# Patient Record
Sex: MALE | Race: BLACK OR AFRICAN AMERICAN | Employment: UNEMPLOYED | URBAN - METROPOLITAN AREA
[De-identification: names, ages, dates, MRNs, and addresses within clinical notes are randomized per-mention and may not be internally consistent; named-entity substitution may affect disease eponyms.]

---

## 2019-01-03 ENCOUNTER — HOSPITAL ENCOUNTER (EMERGENCY)
Age: 24
Discharge: HOME | End: 2019-01-03

## 2019-01-03 ENCOUNTER — APPOINTMENT (OUTPATIENT)
Dept: CT IMAGING | Age: 24
End: 2019-01-03

## 2019-01-03 VITALS
HEIGHT: 73 IN | BODY MASS INDEX: 31.14 KG/M2 | SYSTOLIC BLOOD PRESSURE: 157 MMHG | HEART RATE: 63 BPM | RESPIRATION RATE: 18 BRPM | OXYGEN SATURATION: 99 % | WEIGHT: 235 LBS | DIASTOLIC BLOOD PRESSURE: 82 MMHG | TEMPERATURE: 98.5 F

## 2019-01-03 DIAGNOSIS — R51.9 NONINTRACTABLE EPISODIC HEADACHE, UNSPECIFIED HEADACHE TYPE: Primary | ICD-10-CM

## 2019-01-03 DIAGNOSIS — J01.00 ACUTE MAXILLARY SINUSITIS, RECURRENCE NOT SPECIFIED: ICD-10-CM

## 2019-01-03 PROCEDURE — 72125 CT NECK SPINE W/O DYE: CPT

## 2019-01-03 PROCEDURE — 99283 EMERGENCY DEPT VISIT LOW MDM: CPT

## 2019-01-03 PROCEDURE — 96372 THER/PROPH/DIAG INJ SC/IM: CPT

## 2019-01-03 PROCEDURE — 6370000000 HC RX 637 (ALT 250 FOR IP): Performed by: NURSE PRACTITIONER

## 2019-01-03 PROCEDURE — 6360000002 HC RX W HCPCS: Performed by: NURSE PRACTITIONER

## 2019-01-03 PROCEDURE — 70450 CT HEAD/BRAIN W/O DYE: CPT

## 2019-01-03 RX ORDER — DEXAMETHASONE SODIUM PHOSPHATE 10 MG/ML
10 INJECTION INTRAMUSCULAR; INTRAVENOUS ONCE
Status: COMPLETED | OUTPATIENT
Start: 2019-01-03 | End: 2019-01-03

## 2019-01-03 RX ORDER — CYCLOBENZAPRINE HCL 10 MG
10 TABLET ORAL NIGHTLY PRN
Qty: 30 TABLET | Refills: 0 | Status: SHIPPED | OUTPATIENT
Start: 2019-01-03 | End: 2019-01-13

## 2019-01-03 RX ORDER — CYCLOBENZAPRINE HCL 10 MG
10 TABLET ORAL ONCE
Status: COMPLETED | OUTPATIENT
Start: 2019-01-03 | End: 2019-01-03

## 2019-01-03 RX ORDER — SUMATRIPTAN 6 MG/.5ML
6 INJECTION, SOLUTION SUBCUTANEOUS ONCE
Status: COMPLETED | OUTPATIENT
Start: 2019-01-03 | End: 2019-01-03

## 2019-01-03 RX ORDER — KETOROLAC TROMETHAMINE 30 MG/ML
60 INJECTION, SOLUTION INTRAMUSCULAR; INTRAVENOUS ONCE
Status: COMPLETED | OUTPATIENT
Start: 2019-01-03 | End: 2019-01-03

## 2019-01-03 RX ORDER — BUTALBITAL, ASPIRIN, AND CAFFEINE 50; 325; 40 MG/1; MG/1; MG/1
1 CAPSULE ORAL EVERY 6 HOURS PRN
Qty: 20 CAPSULE | Refills: 0 | Status: SHIPPED | OUTPATIENT
Start: 2019-01-03 | End: 2020-12-22

## 2019-01-03 RX ORDER — AZITHROMYCIN 250 MG/1
TABLET, FILM COATED ORAL
Qty: 6 TABLET | Refills: 0 | Status: SHIPPED | OUTPATIENT
Start: 2019-01-03 | End: 2019-01-13

## 2019-01-03 RX ADMIN — DEXAMETHASONE SODIUM PHOSPHATE 10 MG: 10 INJECTION INTRAMUSCULAR; INTRAVENOUS at 19:42

## 2019-01-03 RX ADMIN — SUMATRIPTAN 6 MG: 6 INJECTION SUBCUTANEOUS at 21:31

## 2019-01-03 RX ADMIN — CYCLOBENZAPRINE HYDROCHLORIDE 10 MG: 10 TABLET, FILM COATED ORAL at 19:43

## 2019-01-03 RX ADMIN — KETOROLAC TROMETHAMINE 60 MG: 30 INJECTION, SOLUTION INTRAMUSCULAR at 19:43

## 2019-01-03 ASSESSMENT — PAIN SCALES - GENERAL
PAINLEVEL_OUTOF10: 10
PAINLEVEL_OUTOF10: 10
PAINLEVEL_OUTOF10: 5

## 2019-01-03 ASSESSMENT — PAIN DESCRIPTION - LOCATION
LOCATION: HEAD
LOCATION: HEAD

## 2019-01-03 ASSESSMENT — PAIN DESCRIPTION - PAIN TYPE: TYPE: ACUTE PAIN

## 2019-05-14 ENCOUNTER — APPOINTMENT (OUTPATIENT)
Dept: GENERAL RADIOLOGY | Age: 24
End: 2019-05-14

## 2019-05-14 ENCOUNTER — HOSPITAL ENCOUNTER (EMERGENCY)
Age: 24
Discharge: HOME | End: 2019-05-14
Attending: EMERGENCY MEDICINE

## 2019-05-14 VITALS
RESPIRATION RATE: 16 BRPM | SYSTOLIC BLOOD PRESSURE: 179 MMHG | TEMPERATURE: 98.6 F | HEART RATE: 75 BPM | WEIGHT: 240 LBS | DIASTOLIC BLOOD PRESSURE: 78 MMHG | BODY MASS INDEX: 30.8 KG/M2 | OXYGEN SATURATION: 97 % | HEIGHT: 74 IN

## 2019-05-14 DIAGNOSIS — M54.50 LUMBAR BACK PAIN: ICD-10-CM

## 2019-05-14 DIAGNOSIS — K62.5 RECTAL BLEEDING: Primary | ICD-10-CM

## 2019-05-14 LAB
ALBUMIN SERPL-MCNC: 4.1 G/DL (ref 3.5–5.2)
ALP BLD-CCNC: 61 U/L (ref 40–129)
ALT SERPL-CCNC: 40 U/L (ref 0–40)
ANION GAP SERPL CALCULATED.3IONS-SCNC: 10 MMOL/L (ref 7–16)
AST SERPL-CCNC: 27 U/L (ref 0–39)
BASOPHILS ABSOLUTE: 0.08 E9/L (ref 0–0.2)
BASOPHILS RELATIVE PERCENT: 0.8 % (ref 0–2)
BILIRUB SERPL-MCNC: 0.2 MG/DL (ref 0–1.2)
BILIRUBIN URINE: NEGATIVE
BLOOD, URINE: NEGATIVE
BUN BLDV-MCNC: 12 MG/DL (ref 6–20)
CALCIUM SERPL-MCNC: 9 MG/DL (ref 8.6–10.2)
CHLORIDE BLD-SCNC: 101 MMOL/L (ref 98–107)
CLARITY: CLEAR
CO2: 26 MMOL/L (ref 22–29)
COLOR: YELLOW
CREAT SERPL-MCNC: 1 MG/DL (ref 0.7–1.2)
EOSINOPHILS ABSOLUTE: 0.24 E9/L (ref 0.05–0.5)
EOSINOPHILS RELATIVE PERCENT: 2.5 % (ref 0–6)
GFR AFRICAN AMERICAN: >60
GFR NON-AFRICAN AMERICAN: >60 ML/MIN/1.73
GLUCOSE BLD-MCNC: 95 MG/DL (ref 74–99)
GLUCOSE URINE: NEGATIVE MG/DL
HCT VFR BLD CALC: 41.8 % (ref 37–54)
HEMOGLOBIN: 13.8 G/DL (ref 12.5–16.5)
IMMATURE GRANULOCYTES #: 0.05 E9/L
IMMATURE GRANULOCYTES %: 0.5 % (ref 0–5)
KETONES, URINE: NEGATIVE MG/DL
LEUKOCYTE ESTERASE, URINE: NEGATIVE
LYMPHOCYTES ABSOLUTE: 3.62 E9/L (ref 1.5–4)
LYMPHOCYTES RELATIVE PERCENT: 38.1 % (ref 20–42)
MCH RBC QN AUTO: 30.3 PG (ref 26–35)
MCHC RBC AUTO-ENTMCNC: 33 % (ref 32–34.5)
MCV RBC AUTO: 91.7 FL (ref 80–99.9)
MONOCYTES ABSOLUTE: 0.87 E9/L (ref 0.1–0.95)
MONOCYTES RELATIVE PERCENT: 9.1 % (ref 2–12)
NEUTROPHILS ABSOLUTE: 4.65 E9/L (ref 1.8–7.3)
NEUTROPHILS RELATIVE PERCENT: 49 % (ref 43–80)
NITRITE, URINE: NEGATIVE
PDW BLD-RTO: 14 FL (ref 11.5–15)
PH UA: 7 (ref 5–9)
PLATELET # BLD: 186 E9/L (ref 130–450)
PMV BLD AUTO: 11.4 FL (ref 7–12)
POTASSIUM SERPL-SCNC: 4.3 MMOL/L (ref 3.5–5)
PROTEIN UA: NEGATIVE MG/DL
RBC # BLD: 4.56 E12/L (ref 3.8–5.8)
SODIUM BLD-SCNC: 137 MMOL/L (ref 132–146)
SPECIFIC GRAVITY UA: 1.01 (ref 1–1.03)
TOTAL PROTEIN: 6.8 G/DL (ref 6.4–8.3)
UROBILINOGEN, URINE: 1 E.U./DL
WBC # BLD: 9.5 E9/L (ref 4.5–11.5)

## 2019-05-14 PROCEDURE — 87088 URINE BACTERIA CULTURE: CPT

## 2019-05-14 PROCEDURE — 85025 COMPLETE CBC W/AUTO DIFF WBC: CPT

## 2019-05-14 PROCEDURE — 81003 URINALYSIS AUTO W/O SCOPE: CPT

## 2019-05-14 PROCEDURE — 99283 EMERGENCY DEPT VISIT LOW MDM: CPT

## 2019-05-14 PROCEDURE — 80053 COMPREHEN METABOLIC PANEL: CPT

## 2019-05-14 PROCEDURE — 72100 X-RAY EXAM L-S SPINE 2/3 VWS: CPT

## 2019-05-14 RX ORDER — PANTOPRAZOLE SODIUM 40 MG/1
40 TABLET, DELAYED RELEASE ORAL DAILY
Qty: 10 TABLET | Refills: 0 | Status: SHIPPED | OUTPATIENT
Start: 2019-05-14 | End: 2020-12-22

## 2019-05-14 RX ORDER — SODIUM CHLORIDE 0.9 % (FLUSH) 0.9 %
10 SYRINGE (ML) INJECTION PRN
Status: DISCONTINUED | OUTPATIENT
Start: 2019-05-14 | End: 2019-05-15 | Stop reason: HOSPADM

## 2019-05-14 RX ORDER — SUCRALFATE 1 G/1
1 TABLET ORAL 4 TIMES DAILY
Qty: 120 TABLET | Refills: 3 | Status: SHIPPED | OUTPATIENT
Start: 2019-05-14 | End: 2020-12-22

## 2019-05-14 RX ORDER — ORPHENADRINE CITRATE 100 MG/1
100 TABLET, EXTENDED RELEASE ORAL 2 TIMES DAILY
Qty: 20 TABLET | Refills: 0 | Status: SHIPPED | OUTPATIENT
Start: 2019-05-14 | End: 2019-05-24

## 2019-05-14 ASSESSMENT — ENCOUNTER SYMPTOMS
SHORTNESS OF BREATH: 0
ABDOMINAL PAIN: 0
EYE REDNESS: 0
BACK PAIN: 1
VOMITING: 0
HEMATOCHEZIA: 1
WHEEZING: 0
EYE PAIN: 0
DIARRHEA: 0
HEMATEMESIS: 0
SINUS PRESSURE: 0
SORE THROAT: 0
NAUSEA: 0
COUGH: 0
BLOOD IN STOOL: 1
EYE DISCHARGE: 0

## 2019-05-14 ASSESSMENT — PAIN DESCRIPTION - ORIENTATION: ORIENTATION: LOWER

## 2019-05-14 ASSESSMENT — PAIN DESCRIPTION - PAIN TYPE: TYPE: ACUTE PAIN

## 2019-05-14 ASSESSMENT — PAIN SCALES - GENERAL: PAINLEVEL_OUTOF10: 10

## 2019-05-14 ASSESSMENT — PAIN DESCRIPTION - FREQUENCY: FREQUENCY: INTERMITTENT

## 2019-05-14 ASSESSMENT — PAIN DESCRIPTION - LOCATION: LOCATION: BACK

## 2019-05-14 ASSESSMENT — PAIN DESCRIPTION - DESCRIPTORS: DESCRIPTORS: THROBBING

## 2019-05-15 NOTE — ED PROVIDER NOTES
28-year-old male presents emergency Department with 2 complaints. Patient states for the last couple days he's had blood in stools. He states he has had some straining to have bowel movements. Patient states that he also has had intermittent low back pains for the last 2 weeks. Worse the lower part of his back he states he's her midline back pain. He denies fevers chills denies use of IV drugs use of steroids use of chemotherapy immunocompromised state problems with bowel or bladder function outside of mild constipation and no issues with urination. He states no tingling down the legs. He states no difficulties in ambulation. The history is provided by the patient. Rectal Bleeding   Quality:  Bright red  Amount:  Scant  Duration:  3 days  Timing:  Intermittent  Chronicity:  New  Context: constipation    Similar prior episodes: no    Relieved by:  Nothing  Worsened by:  Nothing  Ineffective treatments: stool softeners. Associated symptoms: no abdominal pain, no dizziness, no fever, no hematemesis, no light-headedness, no loss of consciousness, no recent illness and no vomiting        Review of Systems   Constitutional: Negative for chills and fever. HENT: Negative for ear pain, sinus pressure and sore throat. Eyes: Negative for pain, discharge and redness. Respiratory: Negative for cough, shortness of breath and wheezing. Cardiovascular: Negative for chest pain. Gastrointestinal: Positive for blood in stool and hematochezia. Negative for abdominal pain, diarrhea, hematemesis, nausea and vomiting. Genitourinary: Negative for dysuria and frequency. Musculoskeletal: Positive for back pain. Negative for arthralgias. Skin: Negative for rash and wound. Neurological: Negative for dizziness, loss of consciousness, weakness, light-headedness and headaches. Hematological: Negative for adenopathy. All other systems reviewed and are negative.       Physical Exam   Constitutional: He is oriented to person, place, and time. He appears well-developed and well-nourished. No distress. HENT:   Head: Normocephalic and atraumatic. Eyes: Pupils are equal, round, and reactive to light. EOM are normal.   Neck: Normal range of motion. Neck supple. Cardiovascular: Normal rate and regular rhythm. Pulmonary/Chest: Effort normal and breath sounds normal.   Abdominal: Soft. Bowel sounds are normal. He exhibits no distension and no mass. There is no tenderness. There is no guarding. Genitourinary: Rectal exam shows no external hemorrhoid, no internal hemorrhoid, no fissure, no mass, no tenderness, anal tone normal and guaiac negative stool. Musculoskeletal:        Lumbar back: He exhibits tenderness and pain. He exhibits normal range of motion, no bony tenderness and no swelling. Neurological: He is alert and oriented to person, place, and time. Procedures    MDM    ED Course as of May 14 2157   Tue May 14, 2019   2049 Patient is seen and examined. Patient had Hemoccult test done that shows no bright red stools and is Hemoccult negative. X-ray of the backs been ordered. Basic lab work to evaluate hemoglobin Electrolytes been ordered. [CF]   2156 Imaging workup and labs are found to be reassuring. The patient is provided proptotic same Carafate to help protect the lining this GI tract. He also given muscle relaxers. Patient is instructed to stay away from medications such as NSAIDs this can irritate rectal bleeding. He is provided a general surgery follow-up as he may need a colonoscopy. He is also provided a family physician to establish with 2 set up for follow up. [CF]      ED Course User Index  [CF] Zonia Stern DO     --------------------------------------------- PAST HISTORY ---------------------------------------------  Past Medical History:  has a past medical history of Asthma.     Past Surgical History:  has a past surgical history that includes hernia repair; [place] tooth extraction; and 0 - 40 U/L    AST 27 0 - 39 U/L   Urinalysis   Result Value Ref Range    Color, UA Yellow Straw/Yellow    Clarity, UA Clear Clear    Glucose, Ur Negative Negative mg/dL    Bilirubin Urine Negative Negative    Ketones, Urine Negative Negative mg/dL    Specific Gravity, UA 1.015 1.005 - 1.030    Blood, Urine Negative Negative    pH, UA 7.0 5.0 - 9.0    Protein, UA Negative Negative mg/dL    Urobilinogen, Urine 1.0 <2.0 E.U./dL    Nitrite, Urine Negative Negative    Leukocyte Esterase, Urine Negative Negative       Radiology:  XR LUMBAR SPINE (2-3 VIEWS)   Final Result   Normal lumbar spinal appearance without evidence of fracture or   displacement. Disc spacing is preserved.                ------------------------- NURSING NOTES AND VITALS REVIEWED ---------------------------  Date / Time Roomed:  5/14/2019  8:20 PM  ED Bed Assignment:  02/02    The nursing notes within the ED encounter and vital signs as below have been reviewed. BP (!) 179/78   Pulse 75   Temp 98.6 °F (37 °C) (Oral)   Resp 16   Ht 6' 2\" (1.88 m)   Wt 240 lb (108.9 kg)   SpO2 97%   BMI 30.81 kg/m²   Oxygen Saturation Interpretation: Normal      ------------------------------------------ PROGRESS NOTES ------------------------------------------  I have spoken with the patient and discussed todays results, in addition to providing specific details for the plan of care and counseling regarding the diagnosis and prognosis. Their questions are answered at this time and they are agreeable with the plan. I discussed at length with them reasons for immediate return here for re evaluation. They will followup with their primary care physician by calling their office tomorrow. --------------------------------- ADDITIONAL PROVIDER NOTES ---------------------------------  At this time the patient is without objective evidence of an acute process requiring hospitalization or inpatient management.   They have remained hemodynamically stable throughout their entire ED visit and are stable for discharge with outpatient follow-up. The plan has been discussed in detail and they are aware of the specific conditions for emergent return, as well as the importance of follow-up. New Prescriptions    ORPHENADRINE (NORFLEX) 100 MG EXTENDED RELEASE TABLET    Take 1 tablet by mouth 2 times daily for 10 days    PANTOPRAZOLE (PROTONIX) 40 MG TABLET    Take 1 tablet by mouth daily for 10 days    SUCRALFATE (CARAFATE) 1 GM TABLET    Take 1 tablet by mouth 4 times daily       Diagnosis:  1. Rectal bleeding    2. Lumbar back pain        Disposition:  Patient's disposition: Discharge to home  Patient's condition is stable.            Drew DO Huong  Resident  05/14/19 [id number]

## 2019-05-17 LAB — URINE CULTURE, ROUTINE: NORMAL

## 2020-12-07 ENCOUNTER — APPOINTMENT (OUTPATIENT)
Dept: GENERAL RADIOLOGY | Age: 25
End: 2020-12-07
Payer: MEDICAID

## 2020-12-07 ENCOUNTER — TELEPHONE (OUTPATIENT)
Dept: ADMINISTRATIVE | Age: 25
End: 2020-12-07

## 2020-12-07 ENCOUNTER — HOSPITAL ENCOUNTER (EMERGENCY)
Age: 25
Discharge: HOME | End: 2020-12-07
Attending: EMERGENCY MEDICINE
Payer: MEDICAID

## 2020-12-07 VITALS
RESPIRATION RATE: 16 BRPM | BODY MASS INDEX: 32.08 KG/M2 | OXYGEN SATURATION: 99 % | WEIGHT: 250 LBS | HEART RATE: 78 BPM | SYSTOLIC BLOOD PRESSURE: 148 MMHG | HEIGHT: 74 IN | TEMPERATURE: 97.6 F | DIASTOLIC BLOOD PRESSURE: 87 MMHG

## 2020-12-07 LAB
AMPHETAMINE SCREEN, URINE: NOT DETECTED
ANION GAP SERPL CALCULATED.3IONS-SCNC: 9 MMOL/L (ref 7–16)
BARBITURATE SCREEN URINE: NOT DETECTED
BASOPHILS ABSOLUTE: 0.06 E9/L (ref 0–0.2)
BASOPHILS RELATIVE PERCENT: 0.8 % (ref 0–2)
BENZODIAZEPINE SCREEN, URINE: NOT DETECTED
BUN BLDV-MCNC: 8 MG/DL (ref 6–20)
CALCIUM SERPL-MCNC: 9.3 MG/DL (ref 8.6–10.2)
CANNABINOID SCREEN URINE: POSITIVE
CHLORIDE BLD-SCNC: 98 MMOL/L (ref 98–107)
CK MB: 2.5 NG/ML (ref 0–7.7)
CO2: 28 MMOL/L (ref 22–29)
COCAINE METABOLITE SCREEN URINE: NOT DETECTED
CREAT SERPL-MCNC: 1.1 MG/DL (ref 0.7–1.2)
D DIMER: <200 NG/ML DDU
EOSINOPHILS ABSOLUTE: 0.21 E9/L (ref 0.05–0.5)
EOSINOPHILS RELATIVE PERCENT: 2.7 % (ref 0–6)
FENTANYL SCREEN, URINE: NOT DETECTED
GFR AFRICAN AMERICAN: >60
GFR NON-AFRICAN AMERICAN: >60 ML/MIN/1.73
GLUCOSE BLD-MCNC: 99 MG/DL (ref 74–99)
HCT VFR BLD CALC: 45.7 % (ref 37–54)
HEMOGLOBIN: 15.3 G/DL (ref 12.5–16.5)
IMMATURE GRANULOCYTES #: 0.04 E9/L
IMMATURE GRANULOCYTES %: 0.5 % (ref 0–5)
LV EF: 60 %
LVEF MODALITY: NORMAL
LYMPHOCYTES ABSOLUTE: 2.32 E9/L (ref 1.5–4)
LYMPHOCYTES RELATIVE PERCENT: 29.6 % (ref 20–42)
Lab: ABNORMAL
MCH RBC QN AUTO: 29.8 PG (ref 26–35)
MCHC RBC AUTO-ENTMCNC: 33.5 % (ref 32–34.5)
MCV RBC AUTO: 88.9 FL (ref 80–99.9)
METHADONE SCREEN, URINE: NOT DETECTED
MONOCYTES ABSOLUTE: 0.67 E9/L (ref 0.1–0.95)
MONOCYTES RELATIVE PERCENT: 8.6 % (ref 2–12)
NEUTROPHILS ABSOLUTE: 4.53 E9/L (ref 1.8–7.3)
NEUTROPHILS RELATIVE PERCENT: 57.8 % (ref 43–80)
OPIATE SCREEN URINE: NOT DETECTED
OXYCODONE URINE: NOT DETECTED
PDW BLD-RTO: 13.9 FL (ref 11.5–15)
PHENCYCLIDINE SCREEN URINE: NOT DETECTED
PLATELET # BLD: 225 E9/L (ref 130–450)
PMV BLD AUTO: 11.2 FL (ref 7–12)
POTASSIUM REFLEX MAGNESIUM: 4.3 MMOL/L (ref 3.5–5)
PRO-BNP: 24 PG/ML (ref 0–125)
RBC # BLD: 5.14 E12/L (ref 3.8–5.8)
SEDIMENTATION RATE, ERYTHROCYTE: 3 MM/HR (ref 0–15)
SODIUM BLD-SCNC: 135 MMOL/L (ref 132–146)
TROPONIN: <0.01 NG/ML (ref 0–0.03)
TROPONIN: <0.01 NG/ML (ref 0–0.03)
WBC # BLD: 7.8 E9/L (ref 4.5–11.5)

## 2020-12-07 PROCEDURE — 85378 FIBRIN DEGRADE SEMIQUANT: CPT

## 2020-12-07 PROCEDURE — 85025 COMPLETE CBC W/AUTO DIFF WBC: CPT

## 2020-12-07 PROCEDURE — 85651 RBC SED RATE NONAUTOMATED: CPT

## 2020-12-07 PROCEDURE — 83880 ASSAY OF NATRIURETIC PEPTIDE: CPT

## 2020-12-07 PROCEDURE — 99204 OFFICE O/P NEW MOD 45 MIN: CPT | Performed by: INTERNAL MEDICINE

## 2020-12-07 PROCEDURE — 93010 ELECTROCARDIOGRAM REPORT: CPT | Performed by: INTERNAL MEDICINE

## 2020-12-07 PROCEDURE — 71045 X-RAY EXAM CHEST 1 VIEW: CPT

## 2020-12-07 PROCEDURE — 93306 TTE W/DOPPLER COMPLETE: CPT

## 2020-12-07 PROCEDURE — 93005 ELECTROCARDIOGRAM TRACING: CPT | Performed by: STUDENT IN AN ORGANIZED HEALTH CARE EDUCATION/TRAINING PROGRAM

## 2020-12-07 PROCEDURE — 82553 CREATINE MB FRACTION: CPT

## 2020-12-07 PROCEDURE — 99284 EMERGENCY DEPT VISIT MOD MDM: CPT

## 2020-12-07 PROCEDURE — 80048 BASIC METABOLIC PNL TOTAL CA: CPT

## 2020-12-07 PROCEDURE — U0003 INFECTIOUS AGENT DETECTION BY NUCLEIC ACID (DNA OR RNA); SEVERE ACUTE RESPIRATORY SYNDROME CORONAVIRUS 2 (SARS-COV-2) (CORONAVIRUS DISEASE [COVID-19]), AMPLIFIED PROBE TECHNIQUE, MAKING USE OF HIGH THROUGHPUT TECHNOLOGIES AS DESCRIBED BY CMS-2020-01-R: HCPCS

## 2020-12-07 PROCEDURE — 84484 ASSAY OF TROPONIN QUANT: CPT

## 2020-12-07 PROCEDURE — 80307 DRUG TEST PRSMV CHEM ANLYZR: CPT

## 2020-12-07 PROCEDURE — 93005 ELECTROCARDIOGRAM TRACING: CPT | Performed by: EMERGENCY MEDICINE

## 2020-12-07 RX ORDER — METHYLPREDNISOLONE 4 MG/1
TABLET ORAL
Qty: 1 KIT | Status: SHIPPED | OUTPATIENT
Start: 2020-12-07 | End: 2020-12-13

## 2020-12-07 RX ORDER — ALBUTEROL SULFATE 90 UG/1
2 AEROSOL, METERED RESPIRATORY (INHALATION) 4 TIMES DAILY PRN
Qty: 1 INHALER | Refills: 0 | Status: SHIPPED | OUTPATIENT
Start: 2020-12-07 | End: 2020-12-21 | Stop reason: SDUPTHER

## 2020-12-07 ASSESSMENT — ENCOUNTER SYMPTOMS
NAUSEA: 0
VOMITING: 0
BACK PAIN: 0
SORE THROAT: 0
COUGH: 0
RHINORRHEA: 0
DIARRHEA: 1
BLOOD IN STOOL: 0
SHORTNESS OF BREATH: 0
ABDOMINAL PAIN: 0
CONSTIPATION: 0

## 2020-12-07 ASSESSMENT — PAIN SCALES - GENERAL: PAINLEVEL_OUTOF10: 7

## 2020-12-07 ASSESSMENT — PAIN DESCRIPTION - LOCATION: LOCATION: CHEST

## 2020-12-07 ASSESSMENT — PAIN DESCRIPTION - PAIN TYPE: TYPE: ACUTE PAIN

## 2020-12-07 NOTE — CONSULTS
CHIEF COMPLAINT: Chest pain    HISTORY OF PRESENT ILLNESS: Patient is a 30 y.o. male seen at the request of Dr. Teresa Zeng and not followed by cardiology. Patient complains of chest pain. Onset was several days ago, with unchanged course since that time. The patient describes the pain as intermittent, occurring with increasing frequency, precordial and pressure like in nature, does not radiate. Patient rates pain as a moderate in intensity. Associated symptoms are chest pain and dyspnea. Aggravating factors are deep inspiration and positional changes. Alleviating factors are: none. Past Medical History:   Diagnosis Date    Asthma        Patient Active Problem List   Diagnosis    Eczema    Moderate persistent asthma without complication    Nonintractable episodic headache    Acute maxillary sinusitis       No Known Allergies    Current Facility-Administered Medications   Medication Dose Route Frequency Provider Last Rate Last Dose    perflutren lipid microspheres (DEFINITY) injection 1.65 mg  1.5 mL Intravenous ONCE PRN Angele Aschoff, DO         Current Outpatient Medications   Medication Sig Dispense Refill    pantoprazole (PROTONIX) 40 MG tablet Take 1 tablet by mouth daily for 10 days 10 tablet 0    sucralfate (CARAFATE) 1 GM tablet Take 1 tablet by mouth 4 times daily 120 tablet 3    butalbital-aspirin-caffeine (FIORINAL) -40 MG per capsule Take 1 capsule by mouth every 6 hours as needed for Headaches for up to 7 days. . 20 capsule 0       Social History     Socioeconomic History    Marital status: Single     Spouse name: Not on file    Number of children: Not on file    Years of education: Not on file    Highest education level: Not on file   Occupational History    Not on file   Social Needs    Financial resource strain: Not on file    Food insecurity     Worry: Not on file     Inability: Not on file    Transportation needs     Medical: Not on file     Non-medical: Not on file Tobacco Use    Smoking status: Never Smoker    Smokeless tobacco: Never Used    Tobacco comment: smokes marjuana, twice a week    Substance and Sexual Activity    Alcohol use: Yes     Comment: Social 1x per month (1 drinks)    Drug use: Yes     Types: Marijuana    Sexual activity: Yes     Partners: Female   Lifestyle    Physical activity     Days per week: Not on file     Minutes per session: Not on file    Stress: Not on file   Relationships    Social connections     Talks on phone: Not on file     Gets together: Not on file     Attends Anabaptism service: Not on file     Active member of club or organization: Not on file     Attends meetings of clubs or organizations: Not on file     Relationship status: Not on file    Intimate partner violence     Fear of current or ex partner: Not on file     Emotionally abused: Not on file     Physically abused: Not on file     Forced sexual activity: Not on file   Other Topics Concern    Not on file   Social History Narrative    Not on file       Family History   Problem Relation Age of Onset    Cancer Paternal Grandmother         breast       Review of Systems:   Heart: as above   Lungs: as above   Eyes: denies changes in vision or discharge. Ears: denies changes in hearing or pain. Nose: denies epistaxis or masses   Throat: denies sore throat or trouble swallowing. Neuro: denies numbness, tingling, tremors. Skin: denies rashes or itching. : denies hematuria, dysuria   GI: denies vomiting, diarrhea   Psych: denies mood changed, anxiety, depression. all others negative. Physical Exam   BP (!) 194/81   Pulse 75   Temp 97.6 °F (36.4 °C)   Resp 16   Ht 6' 2\" (1.88 m)   Wt 250 lb (113.4 kg)   SpO2 100%   BMI 32.10 kg/m²   Constitutional: Oriented to person, place, and time. Well-developed and well-nourished. No distress. Head: Normocephalic and atraumatic. Eyes: EOM are normal. Pupils are equal, round, and reactive to light.    Neck: Normal range of motion. Neck supple. No hepatojugular reflux and no JVD present. Carotid bruit is not present. No tracheal deviation present. No thyromegaly present. Cardiovascular: Normal rate, regular rhythm, normal heart sounds and intact distal pulses. Exam reveals no gallop and no friction rub. No murmur heard. Pulmonary/Chest: Effort normal and breath sounds normal. No respiratory distress. No wheezes. No rales. No tenderness. Abdominal: Soft. Bowel sounds are normal. No distension and no mass. No tenderness. No rebound and no guarding. Musculoskeletal: Normal range of motion. No edema and no tenderness. Lymphadenopathy:   No cervical adenopathy. No groin adenopathy. Neurological: Alert and oriented to person, place, and time. Skin: Skin is warm and dry. No rash noted. Not diaphoretic. No erythema. Psychiatric: Normal mood and affect. Behavior is normal.       CBC:   Lab Results   Component Value Date    WBC 7.8 12/07/2020    RBC 5.14 12/07/2020    HGB 15.3 12/07/2020    HCT 45.7 12/07/2020    MCV 88.9 12/07/2020    MCH 29.8 12/07/2020    MCHC 33.5 12/07/2020    RDW 13.9 12/07/2020     12/07/2020    MPV 11.2 12/07/2020     BMP:   Lab Results   Component Value Date     12/07/2020    K 4.3 12/07/2020    CL 98 12/07/2020    CO2 28 12/07/2020    BUN 8 12/07/2020    LABALBU 4.1 05/14/2019    CREATININE 1.1 12/07/2020    CALCIUM 9.3 12/07/2020    GFRAA >60 12/07/2020    LABGLOM >60 12/07/2020     Magnesium:  No results found for: MG  Cardiac Enzymes:   Lab Results   Component Value Date    CKMB 2.5 12/07/2020    TROPONINI <0.01 12/07/2020      PT/INR:  No results found for: PROTIME, INR  TSH:  No results found for: TSH    Rhythm Strip: normal sinus rhythm. EKG:  normal sinus rhythm, nonspecific ST and T waves changes, early repol.     ASSESSMENT AND PLAN:  Patient Active Problem List   Diagnosis    Eczema    Moderate persistent asthma without complication    Nonintractable episodic headache    Acute maxillary sinusitis     1. Chest pain:  Trop and d-dimer normal.     Echo prelim with normal LVEF and wall motion, no significant valve issues and no pericardial effusion. ESR pending, but clinically suggests pericarditis/pleuritis with underlying viral syndrome. Course of NSAIDs (naproxen 500 mg BID x 7 days) and outpatient follow up. 2. Viral syndrome    3. Asthma    Sarika Mayo D.O.   Cardiologist  Cardiology, [facility]

## 2020-12-07 NOTE — TELEPHONE ENCOUNTER
Patient called to schedule an ER f/u with Dr. Julio Quinonez for chest pain, he is a new patient and can be reached at [phone].

## 2020-12-07 NOTE — ED PROVIDER NOTES
Neymar Crawford is a 30 y.o. male with no PMHx who presents for evaluation of chest pain, beginning two days ago. The complaint has been intermittent, mild in severity, and worsened by change in position. The patient states that two to three days ago he started to develop left sided chest pain and fatigue. He notes that it can be made worse by different positions. Patient notes it can also be made worse when he is at rest.  The patient denies any drug use with exception of marijuana. Denies any cardiac family history. States cancer does run in the family  Patient states he had some diarrhea last week that lasted 2 to 3 days. He notes he is chronically congested and is unsure if he had a recent URI. Patient denies any associated dizziness, lightheadedness, shortness of breath. The history is provided by the patient and medical records. Review of Systems   Constitutional: Positive for fatigue. Negative for chills and fever. HENT: Positive for congestion. Negative for postnasal drip, rhinorrhea and sore throat. Eyes: Negative for visual disturbance. Respiratory: Negative for cough and shortness of breath. Cardiovascular: Positive for chest pain. Gastrointestinal: Positive for diarrhea. Negative for abdominal pain, blood in stool, constipation, nausea and vomiting. Genitourinary: Negative for difficulty urinating, dysuria and urgency. Musculoskeletal: Negative for back pain. Skin: Negative for rash. Neurological: Negative for dizziness, numbness and headaches. Physical Exam  Vitals signs and nursing note reviewed. Constitutional:       General: He is not in acute distress. Appearance: Normal appearance. He is well-developed. He is not ill-appearing. HENT:      Head: Normocephalic and atraumatic. Right Ear: External ear normal.      Left Ear: External ear normal.   Eyes:      General:         Right eye: No discharge. Left eye: No discharge. outpatient. Outpatient COVID also sent. Patient continues to be non-toxic on re-evaluation. Findings were discussed with the patient and reasons to immediately return to the ED were articulated to them. They will follow-up with their PCP and Cardiology. --------------------------------------------- PAST HISTORY ---------------------------------------------  Past Medical History:  has a past medical history of Asthma. Past Surgical History:  has a past surgical history that includes hernia repair; [place] tooth extraction; and Wrist arthroscopy (Left, 06/24/2016). Social History:  reports that he has never smoked. He has never used smokeless tobacco. He reports current alcohol use. He reports current drug use. Drug: Marijuana. Family History: family history includes Cancer in his paternal grandmother. The patients home medications have been reviewed. Allergies: Patient has no known allergies.     -------------------------------------------------- RESULTS -------------------------------------------------  Labs:  Results for orders placed or performed during the hospital encounter of 12/07/20   CBC Auto Differential   Result Value Ref Range    WBC 7.8 4.5 - 11.5 E9/L    RBC 5.14 3.80 - 5.80 E12/L    Hemoglobin 15.3 12.5 - 16.5 g/dL    Hematocrit 45.7 37.0 - 54.0 %    MCV 88.9 80.0 - 99.9 fL    MCH 29.8 26.0 - 35.0 pg    MCHC 33.5 32.0 - 34.5 %    RDW 13.9 11.5 - 15.0 fL    Platelets 653 391 - 337 E9/L    MPV 11.2 7.0 - 12.0 fL    Neutrophils % 57.8 43.0 - 80.0 %    Immature Granulocytes % 0.5 0.0 - 5.0 %    Lymphocytes % 29.6 20.0 - 42.0 %    Monocytes % 8.6 2.0 - 12.0 %    Eosinophils % 2.7 0.0 - 6.0 %    Basophils % 0.8 0.0 - 2.0 %    Neutrophils Absolute 4.53 1.80 - 7.30 E9/L    Immature Granulocytes # 0.04 E9/L    Lymphocytes Absolute 2.32 1.50 - 4.00 E9/L    Monocytes Absolute 0.67 0.10 - 0.95 E9/L    Eosinophils Absolute 0.21 0.05 - 0.50 E9/L    Basophils Absolute 0.06 0.00 - 0.20 E9/L   Brain Natriuretic Peptide   Result Value Ref Range    Pro-BNP 24 0 - 125 pg/mL   Troponin   Result Value Ref Range    Troponin <0.01 0.00 - 0.03 ng/mL   URINE DRUG SCREEN   Result Value Ref Range    Amphetamine Screen, Urine NOT DETECTED Negative <1000 ng/mL    Barbiturate Screen, Ur NOT DETECTED Negative < 200 ng/mL    Benzodiazepine Screen, Urine NOT DETECTED Negative < 200 ng/mL    Cannabinoid Scrn, Ur POSITIVE (A) Negative < 50ng/mL    Cocaine Metabolite Screen, Urine NOT DETECTED Negative < 300 ng/mL    Opiate Scrn, Ur NOT DETECTED Negative < 300ng/mL    PCP Screen, Urine NOT DETECTED Negative < 25 ng/mL    Methadone Screen, Urine NOT DETECTED Negative <300 ng/mL    Oxycodone Urine NOT DETECTED Negative <100 ng/mL    FENTANYL SCREEN, URINE NOT DETECTED Negative <1 ng/mL    Drug Screen Comment: see below    Sedimentation Rate   Result Value Ref Range    Sed Rate 3 0 - 15 mm/Hr   CK-MB   Result Value Ref Range    CK-MB 2.5 0.0 - 7.7 ng/mL   Basic Metabolic Panel w/ Reflex to MG   Result Value Ref Range    Sodium 135 132 - 146 mmol/L    Potassium reflex Magnesium 4.3 3.5 - 5.0 mmol/L    Chloride 98 98 - 107 mmol/L    CO2 28 22 - 29 mmol/L    Anion Gap 9 7 - 16 mmol/L    Glucose 99 74 - 99 mg/dL    BUN 8 6 - 20 mg/dL    CREATININE 1.1 0.7 - 1.2 mg/dL    GFR Non-African American >60 >=60 mL/min/1.73    GFR African American >60     Calcium 9.3 8.6 - 10.2 mg/dL   D-Dimer, Quantitative   Result Value Ref Range    D-Dimer, Quant <200 ng/mL DDU   Troponin   Result Value Ref Range    Troponin <0.01 0.00 - 0.03 ng/mL   Covid-19 Ambulatory   Result Value Ref Range    Source OP swab    EKG 12 Lead   Result Value Ref Range    Ventricular Rate 74 BPM    Atrial Rate 74 BPM    P-R Interval 184 ms    QRS Duration 100 ms    Q-T Interval 366 ms    QTc Calculation (Bazett) 406 ms    P Axis 46 degrees    R Axis 46 degrees    T Axis 26 degrees       Radiology:  XR CHEST PORTABLE   Final Result   No acute cardiopulmonary process. ------------------------- NURSING NOTES AND VITALS REVIEWED ---------------------------  Date / Time Roomed:  12/7/2020  8:06 AM  ED Bed Assignment:  11/11    The nursing notes within the ED encounter and vital signs as below have been reviewed. BP (!) 148/87   Pulse 78   Temp 97.6 °F (36.4 °C)   Resp 16   Ht 6' 2\" (1.88 m)   Wt 250 lb (113.4 kg)   SpO2 99%   BMI 32.10 kg/m²   Oxygen Saturation Interpretation: Normal      ------------------------------------------ PROGRESS NOTES ------------------------------------------  4:22 PM EST  I have spoken with the patient and discussed todays results, in addition to providing specific details for the plan of care and counseling regarding the diagnosis and prognosis. Their questions are answered at this time and they are agreeable with the plan. I discussed at length with them reasons for immediate return here for re evaluation. They will followup with their primary care physician by calling their office tomorrow. --------------------------------- ADDITIONAL PROVIDER NOTES ---------------------------------  At this time the patient is without objective evidence of an acute process requiring hospitalization or inpatient management. They have remained hemodynamically stable throughout their entire ED visit and are stable for discharge with outpatient follow-up. The plan has been discussed in detail and they are aware of the specific conditions for emergent return, as well as the importance of follow-up.       Discharge Medication List as of 12/7/2020  1:10 PM      START taking these medications    Details   naproxen (NAPROXEN) 500 MG EC tablet Take 1 tablet by mouth 2 times daily (with meals) for 7 days, Disp-14 tablet,R-0Print      methylPREDNISolone (MEDROL, MALLORY,) 4 MG tablet Take by mouth., Disp-1 kit,R-zeroPrint      albuterol sulfate HFA (VENTOLIN HFA) 108 (90 Base) MCG/ACT inhaler Inhale 2 puffs into the lungs 4 times daily as needed for Wheezing, Disp-1 Inhaler,R-0Print             Diagnosis:  1. Acute pericarditis, unspecified type    2. Pleurisy    3. Suspected COVID-19 virus infection        Disposition:  Patient's disposition: Discharge to home  Patient's condition is stable.          Samira Damon DO  Resident  12/07/20 [id number]

## 2020-12-08 LAB
EKG ATRIAL RATE: 74 BPM
EKG P AXIS: 46 DEGREES
EKG P-R INTERVAL: 184 MS
EKG Q-T INTERVAL: 366 MS
EKG QRS DURATION: 100 MS
EKG QTC CALCULATION (BAZETT): 406 MS
EKG R AXIS: 46 DEGREES
EKG T AXIS: 26 DEGREES
EKG VENTRICULAR RATE: 74 BPM

## 2020-12-09 LAB
EKG ATRIAL RATE: 66 BPM
EKG P AXIS: 29 DEGREES
EKG P-R INTERVAL: 186 MS
EKG Q-T INTERVAL: 368 MS
EKG QRS DURATION: 98 MS
EKG QTC CALCULATION (BAZETT): 385 MS
EKG R AXIS: 44 DEGREES
EKG T AXIS: 29 DEGREES
EKG VENTRICULAR RATE: 66 BPM
SARS-COV-2: NOT DETECTED
SOURCE: NORMAL

## 2020-12-09 PROCEDURE — 93010 ELECTROCARDIOGRAM REPORT: CPT | Performed by: INTERNAL MEDICINE

## 2020-12-21 ENCOUNTER — OFFICE VISIT (OUTPATIENT)
Dept: CARDIOLOGY CLINIC | Age: 25
End: 2020-12-21
Payer: MEDICAID

## 2020-12-21 VITALS
WEIGHT: 262.4 LBS | SYSTOLIC BLOOD PRESSURE: 124 MMHG | DIASTOLIC BLOOD PRESSURE: 72 MMHG | HEART RATE: 69 BPM | HEIGHT: 74 IN | RESPIRATION RATE: 18 BRPM | BODY MASS INDEX: 33.67 KG/M2

## 2020-12-21 PROCEDURE — 99214 OFFICE O/P EST MOD 30 MIN: CPT | Performed by: INTERNAL MEDICINE

## 2020-12-21 PROCEDURE — 93000 ELECTROCARDIOGRAM COMPLETE: CPT | Performed by: INTERNAL MEDICINE

## 2020-12-21 RX ORDER — ALBUTEROL SULFATE 90 UG/1
2 AEROSOL, METERED RESPIRATORY (INHALATION) 4 TIMES DAILY PRN
Qty: 1 INHALER | Refills: 3 | Status: SHIPPED | OUTPATIENT
Start: 2020-12-21

## 2020-12-21 NOTE — PROGRESS NOTES
CHIEF COMPLAINT: Chest pain    HISTORY OF PRESENT ILLNESS: Patient is a 30 y.o. male seen at the request of Dr. Vivek Kothari. Patient seen in [facility] for CP. Mild residual CP. No SOB. Past Medical History:   Diagnosis Date    Asthma        Patient Active Problem List   Diagnosis    Eczema    Moderate persistent asthma without complication    Nonintractable episodic headache    Acute maxillary sinusitis    Precordial pain       No Known Allergies    Current Outpatient Medications   Medication Sig Dispense Refill    naproxen (NAPROXEN) 500 MG EC tablet Take 1 tablet by mouth 2 times daily (with meals) for 7 days 14 tablet 0    albuterol sulfate HFA (VENTOLIN HFA) 108 (90 Base) MCG/ACT inhaler Inhale 2 puffs into the lungs 4 times daily as needed for Wheezing 1 Inhaler 0    pantoprazole (PROTONIX) 40 MG tablet Take 1 tablet by mouth daily for 10 days 10 tablet 0    sucralfate (CARAFATE) 1 GM tablet Take 1 tablet by mouth 4 times daily 120 tablet 3    butalbital-aspirin-caffeine (FIORINAL) -40 MG per capsule Take 1 capsule by mouth every 6 hours as needed for Headaches for up to 7 days. . 20 capsule 0     No current facility-administered medications for this visit.         Social History     Socioeconomic History    Marital status: Single     Spouse name: Not on file    Number of children: Not on file    Years of education: Not on file    Highest education level: Not on file   Occupational History    Not on file   Social Needs    Financial resource strain: Not on file    Food insecurity     Worry: Not on file     Inability: Not on file    Transportation needs     Medical: Not on file     Non-medical: Not on file   Tobacco Use    Smoking status: Never Smoker    Smokeless tobacco: Never Used    Tobacco comment: smokes marjuana, twice a week    Substance and Sexual Activity    Alcohol use: Yes     Comment: Social 1x per month (1 drinks)    Drug use: Yes     Types: Marijuana  Sexual activity: Yes     Partners: Female   Lifestyle    Physical activity     Days per week: Not on file     Minutes per session: Not on file    Stress: Not on file   Relationships    Social connections     Talks on phone: Not on file     Gets together: Not on file     Attends Synagogue service: Not on file     Active member of club or organization: Not on file     Attends meetings of clubs or organizations: Not on file     Relationship status: Not on file    Intimate partner violence     Fear of current or ex partner: Not on file     Emotionally abused: Not on file     Physically abused: Not on file     Forced sexual activity: Not on file   Other Topics Concern    Not on file   Social History Narrative    Not on file       Family History   Problem Relation Age of Onset    Cancer Paternal Grandmother         breast     Review of Systems:   Heart: as above   Lungs: as above   Eyes: denies changes in vision or discharge. Ears: denies changes in hearing or pain. Nose: denies epistaxis or masses   Throat: denies sore throat or trouble swallowing. Neuro: denies numbness, tingling, tremors. Skin: denies rashes or itching. : denies hematuria, dysuria   GI: denies vomiting, diarrhea   Psych: denies mood changed, anxiety, depression. all others negative. Physical Exam   There were no vitals taken for this visit. Constitutional: Oriented to person, place, and time. Well-developed and well-nourished. No distress. Head: Normocephalic and atraumatic. Eyes: EOM are normal. Pupils are equal, round, and reactive to light. Neck: Normal range of motion. Neck supple. No hepatojugular reflux and no JVD present. Carotid bruit is not present. No tracheal deviation present. No thyromegaly present. Cardiovascular: Normal rate, regular rhythm, normal heart sounds and intact distal pulses. Exam reveals no gallop and no friction rub. No murmur heard. Pulmonary/Chest: Effort normal and breath sounds normal. No respiratory distress. No wheezes. No rales. No tenderness. Abdominal: Soft. Bowel sounds are normal. No distension and no mass. No tenderness. No rebound and no guarding. Musculoskeletal: Normal range of motion. No edema and no tenderness. Lymphadenopathy:   No cervical adenopathy. No groin adenopathy. Neurological: Alert and oriented to person, place, and time. Skin: Skin is warm and dry. No rash noted. Not diaphoretic. No erythema. Psychiatric: Normal mood and affect. Behavior is normal.     EKG:  normal sinus rhythm, nonspecific ST and T waves changes, early repol. ASSESSMENT AND PLAN:  Patient Active Problem List   Diagnosis    Eczema    Moderate persistent asthma without complication    Nonintractable episodic headache    Acute maxillary sinusitis    Precordial pain     1. Chest pain:  Trop and d-dimer normal.     Echo prelim with normal LVEF and wall motion, no significant valve issues and no pericardial effusion. Clinically suggests pericarditis/pleuritis with underlying viral syndrome. Continue NSAIDs (naproxen 500 mg BID) for additional week. 2. Viral syndrome    3. Asthma: Albuterol. Ida Mondragon D.O.   Cardiologist  Cardiology, [address]

## 2021-08-30 ENCOUNTER — HOSPITAL ENCOUNTER (EMERGENCY)
Age: 26
Discharge: HOME | End: 2021-08-30
Attending: EMERGENCY MEDICINE

## 2021-08-30 ENCOUNTER — APPOINTMENT (OUTPATIENT)
Dept: GENERAL RADIOLOGY | Age: 26
End: 2021-08-30

## 2021-08-30 VITALS
HEIGHT: 74 IN | SYSTOLIC BLOOD PRESSURE: 132 MMHG | RESPIRATION RATE: 18 BRPM | DIASTOLIC BLOOD PRESSURE: 68 MMHG | WEIGHT: 250 LBS | TEMPERATURE: 98.8 F | OXYGEN SATURATION: 99 % | BODY MASS INDEX: 32.08 KG/M2 | HEART RATE: 85 BPM

## 2021-08-30 DIAGNOSIS — R07.1 CHEST PAIN ON BREATHING: Primary | ICD-10-CM

## 2021-08-30 LAB
ALBUMIN SERPL-MCNC: 4.3 G/DL (ref 3.5–5.2)
ALP BLD-CCNC: 81 U/L (ref 40–129)
ALT SERPL-CCNC: 47 U/L (ref 0–40)
ANION GAP SERPL CALCULATED.3IONS-SCNC: 10 MMOL/L (ref 7–16)
AST SERPL-CCNC: 36 U/L (ref 0–39)
BASOPHILS ABSOLUTE: 0.08 E9/L (ref 0–0.2)
BASOPHILS RELATIVE PERCENT: 0.8 % (ref 0–2)
BILIRUB SERPL-MCNC: 0.4 MG/DL (ref 0–1.2)
BILIRUBIN DIRECT: <0.2 MG/DL (ref 0–0.3)
BILIRUBIN, INDIRECT: ABNORMAL MG/DL (ref 0–1)
BUN BLDV-MCNC: 12 MG/DL (ref 6–20)
CALCIUM SERPL-MCNC: 8.9 MG/DL (ref 8.6–10.2)
CHLORIDE BLD-SCNC: 100 MMOL/L (ref 98–107)
CO2: 25 MMOL/L (ref 22–29)
CREAT SERPL-MCNC: 1.1 MG/DL (ref 0.7–1.2)
D DIMER: <200 NG/ML DDU
EKG ATRIAL RATE: 99 BPM
EKG P AXIS: 55 DEGREES
EKG P-R INTERVAL: 162 MS
EKG Q-T INTERVAL: 338 MS
EKG QRS DURATION: 92 MS
EKG QTC CALCULATION (BAZETT): 433 MS
EKG R AXIS: 46 DEGREES
EKG T AXIS: 17 DEGREES
EKG VENTRICULAR RATE: 99 BPM
EOSINOPHILS ABSOLUTE: 0.11 E9/L (ref 0.05–0.5)
EOSINOPHILS RELATIVE PERCENT: 1.1 % (ref 0–6)
GFR AFRICAN AMERICAN: >60
GFR NON-AFRICAN AMERICAN: >60 ML/MIN/1.73
GLUCOSE BLD-MCNC: 106 MG/DL (ref 74–99)
HCT VFR BLD CALC: 43.9 % (ref 37–54)
HEMOGLOBIN: 14.6 G/DL (ref 12.5–16.5)
IMMATURE GRANULOCYTES #: 0.04 E9/L
IMMATURE GRANULOCYTES %: 0.4 % (ref 0–5)
LYMPHOCYTES ABSOLUTE: 1.37 E9/L (ref 1.5–4)
LYMPHOCYTES RELATIVE PERCENT: 13.1 % (ref 20–42)
MCH RBC QN AUTO: 29.7 PG (ref 26–35)
MCHC RBC AUTO-ENTMCNC: 33.3 % (ref 32–34.5)
MCV RBC AUTO: 89.4 FL (ref 80–99.9)
MONOCYTES ABSOLUTE: 0.74 E9/L (ref 0.1–0.95)
MONOCYTES RELATIVE PERCENT: 7.1 % (ref 2–12)
NEUTROPHILS ABSOLUTE: 8.12 E9/L (ref 1.8–7.3)
NEUTROPHILS RELATIVE PERCENT: 77.5 % (ref 43–80)
PDW BLD-RTO: 13.9 FL (ref 11.5–15)
PLATELET # BLD: 195 E9/L (ref 130–450)
PMV BLD AUTO: 11.4 FL (ref 7–12)
POTASSIUM REFLEX MAGNESIUM: 4 MMOL/L (ref 3.5–5)
RBC # BLD: 4.91 E12/L (ref 3.8–5.8)
SODIUM BLD-SCNC: 135 MMOL/L (ref 132–146)
TOTAL PROTEIN: 7.6 G/DL (ref 6.4–8.3)
TROPONIN, HIGH SENSITIVITY: 6 NG/L (ref 0–11)
TROPONIN, HIGH SENSITIVITY: <6 NG/L (ref 0–11)
WBC # BLD: 10.5 E9/L (ref 4.5–11.5)

## 2021-08-30 PROCEDURE — 36415 COLL VENOUS BLD VENIPUNCTURE: CPT

## 2021-08-30 PROCEDURE — 93005 ELECTROCARDIOGRAM TRACING: CPT | Performed by: EMERGENCY MEDICINE

## 2021-08-30 PROCEDURE — 71045 X-RAY EXAM CHEST 1 VIEW: CPT

## 2021-08-30 PROCEDURE — 84484 ASSAY OF TROPONIN QUANT: CPT

## 2021-08-30 PROCEDURE — 96374 THER/PROPH/DIAG INJ IV PUSH: CPT

## 2021-08-30 PROCEDURE — 6370000000 HC RX 637 (ALT 250 FOR IP): Performed by: STUDENT IN AN ORGANIZED HEALTH CARE EDUCATION/TRAINING PROGRAM

## 2021-08-30 PROCEDURE — 85025 COMPLETE CBC W/AUTO DIFF WBC: CPT

## 2021-08-30 PROCEDURE — 80048 BASIC METABOLIC PNL TOTAL CA: CPT

## 2021-08-30 PROCEDURE — 85378 FIBRIN DEGRADE SEMIQUANT: CPT

## 2021-08-30 PROCEDURE — 94664 DEMO&/EVAL PT USE INHALER: CPT

## 2021-08-30 PROCEDURE — 6360000002 HC RX W HCPCS: Performed by: STUDENT IN AN ORGANIZED HEALTH CARE EDUCATION/TRAINING PROGRAM

## 2021-08-30 PROCEDURE — 93010 ELECTROCARDIOGRAM REPORT: CPT | Performed by: INTERNAL MEDICINE

## 2021-08-30 PROCEDURE — 80076 HEPATIC FUNCTION PANEL: CPT

## 2021-08-30 PROCEDURE — 99284 EMERGENCY DEPT VISIT MOD MDM: CPT

## 2021-08-30 RX ORDER — KETOROLAC TROMETHAMINE 30 MG/ML
15 INJECTION, SOLUTION INTRAMUSCULAR; INTRAVENOUS ONCE
Status: COMPLETED | OUTPATIENT
Start: 2021-08-30 | End: 2021-08-30

## 2021-08-30 RX ORDER — IPRATROPIUM BROMIDE AND ALBUTEROL SULFATE 2.5; .5 MG/3ML; MG/3ML
1 SOLUTION RESPIRATORY (INHALATION)
Status: DISCONTINUED | OUTPATIENT
Start: 2021-08-30 | End: 2021-08-30

## 2021-08-30 RX ORDER — NAPROXEN 250 MG/1
250 TABLET ORAL 2 TIMES DAILY WITH MEALS
Qty: 20 TABLET | Refills: 0 | Status: SHIPPED | OUTPATIENT
Start: 2021-08-30 | End: 2021-09-22 | Stop reason: ALTCHOICE

## 2021-08-30 RX ADMIN — IPRATROPIUM BROMIDE AND ALBUTEROL SULFATE 1 AMPULE: .5; 3 SOLUTION RESPIRATORY (INHALATION) at 01:53

## 2021-08-30 RX ADMIN — KETOROLAC TROMETHAMINE 15 MG: 30 INJECTION, SOLUTION INTRAMUSCULAR at 01:51

## 2021-08-30 ASSESSMENT — ENCOUNTER SYMPTOMS
DIARRHEA: 1
EYE PAIN: 0
VOMITING: 0
SINUS PRESSURE: 0
COUGH: 0
SHORTNESS OF BREATH: 0
EYE DISCHARGE: 0
SORE THROAT: 0
ABDOMINAL PAIN: 0
WHEEZING: 0
BACK PAIN: 0
NAUSEA: 0
EYE REDNESS: 0

## 2021-08-30 ASSESSMENT — PAIN SCALES - GENERAL
PAINLEVEL_OUTOF10: 9
PAINLEVEL_OUTOF10: 9

## 2021-08-30 ASSESSMENT — PAIN DESCRIPTION - ORIENTATION: ORIENTATION: RIGHT

## 2021-08-30 ASSESSMENT — PAIN DESCRIPTION - LOCATION: LOCATION: CHEST

## 2021-08-30 ASSESSMENT — PAIN DESCRIPTION - PAIN TYPE: TYPE: ACUTE PAIN

## 2021-09-22 ENCOUNTER — HOSPITAL ENCOUNTER (EMERGENCY)
Age: 26
Discharge: HOME | End: 2021-09-22

## 2021-09-22 VITALS
HEIGHT: 73 IN | HEART RATE: 86 BPM | OXYGEN SATURATION: 97 % | TEMPERATURE: 97 F | DIASTOLIC BLOOD PRESSURE: 89 MMHG | BODY MASS INDEX: 33.13 KG/M2 | WEIGHT: 250 LBS | RESPIRATION RATE: 18 BRPM | SYSTOLIC BLOOD PRESSURE: 155 MMHG
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
## 2021-09-22 DIAGNOSIS — S80.861A NONVENOMOUS INSECT BITE OF RIGHT LOWER EXTREMITY, INITIAL ENCOUNTER: Primary | ICD-10-CM

## 2021-09-22 DIAGNOSIS — R22.41 LOCALIZED SWELLING OF RIGHT FOOT: ICD-10-CM

## 2021-09-22 PROCEDURE — 99282 EMERGENCY DEPT VISIT SF MDM: CPT

## 2021-09-22 RX ORDER — NAPROXEN 500 MG/1
500 TABLET ORAL 2 TIMES DAILY
Qty: 60 TABLET | Refills: 0 | Status: SHIPPED | OUTPATIENT
Start: 2021-09-22

## 2021-09-22 RX ORDER — CEPHALEXIN 500 MG/1
500 CAPSULE ORAL 4 TIMES DAILY
Qty: 28 CAPSULE | Refills: 0 | Status: SHIPPED | OUTPATIENT
Start: 2021-09-22 | End: 2021-09-29

## 2021-09-22 RX ORDER — TRIAMCINOLONE ACETONIDE 5 MG/G
CREAM TOPICAL
Qty: 45 G | Refills: 0 | Status: SHIPPED | OUTPATIENT
Start: 2021-09-22 | End: 2021-09-29

## 2021-09-22 RX ORDER — METHYLPREDNISOLONE 4 MG/1
TABLET ORAL
Qty: 1 KIT | Refills: 0 | Status: SHIPPED | OUTPATIENT
Start: 2021-09-22 | End: 2021-09-28

## 2021-09-22 ASSESSMENT — PAIN SCALES - GENERAL
PAINLEVEL_OUTOF10: 9
PAINLEVEL_OUTOF10: 9

## 2021-09-23 NOTE — ED PROVIDER NOTES
[facility]     Department of Emergency Medicine   ED  Provider Note  Admit Date/RoomTime: 9/22/2021  8:39 PM  ED Room: [facility] [place]-    Chief Complaint   Insect Bite (right foot, unsure of what he was bit by)    History of Present Illness      Ramu Bonilla is a 27 y.o. old male who presents to the emergency department for insect bite to right foot. Patient states he is unsure what he was bit by but did have a rental car from [place] a couple days ago. He states he was wearing flip-flops and felt like he was bit while he was in the car. He has pain and swelling to his right third and fourth toes. He states the pain is worse with bearing weight. He reports itching and burning as well. He denies any fever/chills, chest pain, shortness of breath, abdominal pain, nausea, vomiting, or numbness/tingling. Patient has no lymphatic streaking. He is alert oriented x3 and in no apparent distress at this exam.  He is nontoxic-appearing. Patient is not a diabetic. He has not tried any over-the-counter medication. ROS   Pertinent positives and negatives are stated within HPI, all other systems reviewed and are negative. Past Medical History:   Past Medical History:   Diagnosis Date    Asthma       Past Surgical History:  has a past surgical history that includes hernia repair; [place] tooth extraction; and Wrist arthroscopy (Left, 06/24/2016). Social History:  reports that he has never smoked. He has never used smokeless tobacco. He reports current alcohol use. He reports current drug use. Drug: Marijuana. Family History: family history includes Cancer in his paternal grandmother. Allergies: Patient has no known allergies.     Physical Exam     Vitals:    09/22/21 2016 09/22/21 2036   BP:  (!) 155/89   Pulse: 99 86   Resp: 16 18   Temp:  97 °F (36.1 °C)   TempSrc:  Temporal   SpO2: 97% 97%   Weight:  250 lb (113.4 kg)   Height:  6' 1\" (1.854 m)   Oxygen Saturation Interpretation: Normal.    Constitutional:  Alert and oriented x3, development consistent with age. NAD  HEENT:  NC/NT. Airway patent. Neck:  Normal ROM. Supple. Non-tender  Back: No lumbar tenderness. Lower Extremity:  Right: foot. Tenderness: Mild with palpation to third toe and dorsal aspect of foot            Swelling: Moderate to third toe, mild to fourth and fifth toes and mild to dorsal aspect of right foot            Deformity: No.             ROM: diminished range with pain. Skin: Probable insect bite to dorsal aspect of right third toe with no eschar, intact sensations distally, compartments soft and compressible, no drainage or bleeding         Distal Function:              Motor deficit: none. Sensory deficit: none. Pulse deficit: none. Capillary refill: normal.  Integument:  Normal turgor. Warm, dry, without visible rash, unless noted elsewhere. Neurological: Motor functions intact. Lab / Imaging Results   (All laboratory and radiology results have been personally reviewed by myself)  Labs:  No results found for this visit on 09/22/21. Imaging: All Radiology results interpreted by Radiologist unless otherwise noted. No orders to display     ED Course / Medical Decision Making   Medications - No data to display     Consult(s):  None    Procedure(s):  none    MDM:      Films were not obtained based on no suspicion for bony injury as per history/physical findings . Plan is subsequently for symptom control, limited use and  immobilization with appropriate outpatient follow-up. Counseling: The emergency provider has spoken with the patient or caregiver and discussed todays results, in addition to providing specific details for the plan of care and counseling regarding the diagnosis and prognosis. Questions are answered at this time and they are agreeable with the plan. Patient understands they must follow-up with PCP for wound check. RICE discussed. They were educated on signs and symptoms that would require emergent return. Patient was educated on newly prescribed medications. They were also instructed on wound care and signs and symptoms of infection. Patient was able to ambulate of department no difficulty. Assessment      1. Nonvenomous insect bite of right lower extremity, initial encounter    2. Localized swelling of right foot      Plan   Discharge to home  Patient condition is good    New Medications     Discharge Medication List as of 9/22/2021  9:11 PM      START taking these medications    Details   cephALEXin (KEFLEX) 500 MG capsule Take 1 capsule by mouth 4 times daily for 7 days, Disp-28 capsule, R-0Print      triamcinolone (ARISTOCORT) 0.5 % cream Apply topically 3 times daily. , Disp-45 g, R-0, Print      naproxen (NAPROSYN) 500 MG tablet Take 1 tablet by mouth 2 times daily, Disp-60 tablet, R-0Print      methylPREDNISolone (MEDROL, MALLORY,) 4 MG tablet Take by mouth., Disp-1 kit, R-0Print             Electronically signed by Jorge Noel PA-C   DD: 9/22/21  **This report was transcribed using voice recognition software. Every effort was made to ensure accuracy; however, inadvertent computerized transcription errors may be present.     END OF ED PROVIDER NOTE       Jorge Noel PA-C  09/22/21 [id number]

## 2023-07-30 ENCOUNTER — HOSPITAL ENCOUNTER (EMERGENCY)
Age: 28
Discharge: HOME | End: 2023-07-30

## 2023-07-30 VITALS
DIASTOLIC BLOOD PRESSURE: 100 MMHG | TEMPERATURE: 97.9 F | HEART RATE: 61 BPM | OXYGEN SATURATION: 97 % | SYSTOLIC BLOOD PRESSURE: 148 MMHG | WEIGHT: 255 LBS | HEIGHT: 74 IN | BODY MASS INDEX: 32.73 KG/M2 | RESPIRATION RATE: 16 BRPM

## 2023-07-30 DIAGNOSIS — H10.9 BACTERIAL CONJUNCTIVITIS OF LEFT EYE: Primary | ICD-10-CM

## 2023-07-30 PROCEDURE — 6370000000 HC RX 637 (ALT 250 FOR IP): Performed by: NURSE PRACTITIONER

## 2023-07-30 PROCEDURE — 99283 EMERGENCY DEPT VISIT LOW MDM: CPT

## 2023-07-30 RX ORDER — NEOMYCIN SULFATE, POLYMYXIN B SULFATE AND DEXAMETHASONE 3.5; 10000; 1 MG/ML; [USP'U]/ML; MG/ML
1 SUSPENSION/ DROPS OPHTHALMIC 4 TIMES DAILY
Qty: 5 ML | Refills: 0 | Status: SHIPPED | OUTPATIENT
Start: 2023-07-30 | End: 2023-08-06

## 2023-07-30 RX ORDER — TETRACAINE HYDROCHLORIDE 5 MG/ML
1 SOLUTION OPHTHALMIC ONCE
Status: COMPLETED | OUTPATIENT
Start: 2023-07-30 | End: 2023-07-30

## 2023-07-30 RX ADMIN — FLUORESCEIN SODIUM 1 MG: 1 STRIP OPHTHALMIC at 16:40

## 2023-07-30 RX ADMIN — TETRACAINE HYDROCHLORIDE 1 DROP: 5 SOLUTION OPHTHALMIC at 16:40

## 2023-07-30 ASSESSMENT — VISUAL ACUITY
OU: 20/20
OD: 20/20
OS: 20/200

## 2023-07-30 ASSESSMENT — PAIN DESCRIPTION - FREQUENCY: FREQUENCY: CONTINUOUS

## 2023-07-30 ASSESSMENT — PAIN DESCRIPTION - LOCATION: LOCATION: EYE

## 2023-07-30 ASSESSMENT — PAIN SCALES - GENERAL: PAINLEVEL_OUTOF10: 9

## 2023-07-30 ASSESSMENT — PAIN - FUNCTIONAL ASSESSMENT: PAIN_FUNCTIONAL_ASSESSMENT: 0-10

## 2023-07-30 ASSESSMENT — PAIN DESCRIPTION - ORIENTATION: ORIENTATION: LEFT

## 2023-07-30 ASSESSMENT — PAIN DESCRIPTION - ONSET: ONSET: SUDDEN

## 2023-07-30 ASSESSMENT — PAIN DESCRIPTION - DESCRIPTORS: DESCRIPTORS: DISCOMFORT

## 2023-07-30 ASSESSMENT — PAIN DESCRIPTION - PAIN TYPE: TYPE: ACUTE PAIN

## 2023-07-30 NOTE — DISCHARGE INSTRUCTIONS
Leave the contact lens out of your left eye for at least 7 days. You will start Maxitrol eyedrops 4 times daily for the next 7 days. Make sure you are washing her hands very well before and after putting the eyedrops in. If you develop any worsening eye pain, fevers, severe headache, changes in vision please return to the ER. Otherwise follow-up with ophthalmology.

## 2023-07-30 NOTE — ED PROVIDER NOTES
noon, in the evening, and at bedtime for 7 days     Electronically signed by STAR Miller CNP   DD: 7/30/23  **This report was transcribed using voice recognition software. Every effort was made to ensure accuracy; however, inadvertent computerized transcription errors may be present.   END OF ED PROVIDER NOTE      STAR Miller CNP  07/30/23 [id number]